# Patient Record
Sex: FEMALE | Race: WHITE | NOT HISPANIC OR LATINO
[De-identification: names, ages, dates, MRNs, and addresses within clinical notes are randomized per-mention and may not be internally consistent; named-entity substitution may affect disease eponyms.]

---

## 2018-06-22 ENCOUNTER — APPOINTMENT (OUTPATIENT)
Dept: OBGYN | Facility: CLINIC | Age: 48
End: 2018-06-22
Payer: COMMERCIAL

## 2018-06-22 VITALS — HEIGHT: 59 IN | WEIGHT: 157 LBS | BODY MASS INDEX: 31.65 KG/M2

## 2018-06-22 DIAGNOSIS — Z00.00 ENCOUNTER FOR GENERAL ADULT MEDICAL EXAMINATION W/OUT ABNORMAL FINDINGS: ICD-10-CM

## 2018-06-22 DIAGNOSIS — R10.2 PELVIC AND PERINEAL PAIN: ICD-10-CM

## 2018-06-22 PROCEDURE — 99214 OFFICE O/P EST MOD 30 MIN: CPT

## 2018-06-25 LAB
C TRACH RRNA SPEC QL NAA+PROBE: NOT DETECTED
HPV HIGH+LOW RISK DNA PNL CVX: NOT DETECTED
N GONORRHOEA RRNA SPEC QL NAA+PROBE: NOT DETECTED
SOURCE TP AMPLIFICATION: NORMAL

## 2018-06-27 LAB — CYTOLOGY CVX/VAG DOC THIN PREP: NORMAL

## 2018-07-11 ENCOUNTER — ASOB RESULT (OUTPATIENT)
Age: 48
End: 2018-07-11

## 2018-07-11 ENCOUNTER — APPOINTMENT (OUTPATIENT)
Dept: OBGYN | Facility: CLINIC | Age: 48
End: 2018-07-11
Payer: COMMERCIAL

## 2018-07-11 PROCEDURE — 76830 TRANSVAGINAL US NON-OB: CPT

## 2018-07-11 PROCEDURE — 76857 US EXAM PELVIC LIMITED: CPT

## 2018-07-24 ENCOUNTER — TRANSCRIPTION ENCOUNTER (OUTPATIENT)
Age: 48
End: 2018-07-24

## 2018-08-03 ENCOUNTER — APPOINTMENT (OUTPATIENT)
Dept: OBGYN | Facility: CLINIC | Age: 48
End: 2018-08-03
Payer: COMMERCIAL

## 2018-08-03 VITALS — WEIGHT: 159 LBS | BODY MASS INDEX: 32.05 KG/M2 | HEIGHT: 59 IN

## 2018-08-03 DIAGNOSIS — N93.8 OTHER SPECIFIED ABNORMAL UTERINE AND VAGINAL BLEEDING: ICD-10-CM

## 2018-08-03 PROCEDURE — 99213 OFFICE O/P EST LOW 20 MIN: CPT

## 2018-08-23 ENCOUNTER — TRANSCRIPTION ENCOUNTER (OUTPATIENT)
Age: 48
End: 2018-08-23

## 2018-08-28 ENCOUNTER — OUTPATIENT (OUTPATIENT)
Dept: OUTPATIENT SERVICES | Facility: HOSPITAL | Age: 48
LOS: 1 days | End: 2018-08-28
Payer: COMMERCIAL

## 2018-08-28 DIAGNOSIS — Z01.818 ENCOUNTER FOR OTHER PREPROCEDURAL EXAMINATION: ICD-10-CM

## 2018-08-28 LAB — HCG UR QL: NEGATIVE — SIGNIFICANT CHANGE UP

## 2018-08-28 PROCEDURE — G0463: CPT

## 2018-08-28 PROCEDURE — 81025 URINE PREGNANCY TEST: CPT

## 2018-09-06 ENCOUNTER — RESULT REVIEW (OUTPATIENT)
Age: 48
End: 2018-09-06

## 2018-09-06 ENCOUNTER — APPOINTMENT (OUTPATIENT)
Dept: OBGYN | Facility: AMBULATORY SURGERY CENTER | Age: 48
End: 2018-09-06
Payer: COMMERCIAL

## 2018-09-06 PROCEDURE — 58558 HYSTEROSCOPY BIOPSY: CPT

## 2018-09-14 ENCOUNTER — APPOINTMENT (OUTPATIENT)
Dept: OBGYN | Facility: CLINIC | Age: 48
End: 2018-09-14
Payer: COMMERCIAL

## 2018-09-14 VITALS
BODY MASS INDEX: 30.24 KG/M2 | WEIGHT: 150 LBS | DIASTOLIC BLOOD PRESSURE: 89 MMHG | HEIGHT: 59 IN | SYSTOLIC BLOOD PRESSURE: 142 MMHG | HEART RATE: 105 BPM

## 2018-09-14 DIAGNOSIS — N95.0 POSTMENOPAUSAL BLEEDING: ICD-10-CM

## 2018-09-14 PROCEDURE — 99213 OFFICE O/P EST LOW 20 MIN: CPT

## 2018-10-16 ENCOUNTER — TRANSCRIPTION ENCOUNTER (OUTPATIENT)
Age: 48
End: 2018-10-16

## 2018-10-17 ENCOUNTER — APPOINTMENT (OUTPATIENT)
Dept: ORTHOPEDIC SURGERY | Facility: CLINIC | Age: 48
End: 2018-10-17

## 2019-03-21 ENCOUNTER — TRANSCRIPTION ENCOUNTER (OUTPATIENT)
Age: 49
End: 2019-03-21

## 2019-03-21 ENCOUNTER — EMERGENCY (EMERGENCY)
Facility: HOSPITAL | Age: 49
LOS: 1 days | Discharge: HOME | End: 2019-03-21
Attending: EMERGENCY MEDICINE
Payer: COMMERCIAL

## 2019-03-21 VITALS
TEMPERATURE: 98 F | OXYGEN SATURATION: 100 % | RESPIRATION RATE: 18 BRPM | SYSTOLIC BLOOD PRESSURE: 152 MMHG | DIASTOLIC BLOOD PRESSURE: 88 MMHG | HEART RATE: 94 BPM

## 2019-03-21 LAB
ALBUMIN SERPL ELPH-MCNC: 4.8 G/DL — SIGNIFICANT CHANGE UP (ref 3.3–5.2)
ALP SERPL-CCNC: 99 U/L — SIGNIFICANT CHANGE UP (ref 40–120)
ALT FLD-CCNC: 12 U/L — SIGNIFICANT CHANGE UP
ANION GAP SERPL CALC-SCNC: 15 MMOL/L — SIGNIFICANT CHANGE UP (ref 5–17)
AST SERPL-CCNC: 15 U/L — SIGNIFICANT CHANGE UP
BILIRUB SERPL-MCNC: 0.7 MG/DL — SIGNIFICANT CHANGE UP (ref 0.4–2)
BUN SERPL-MCNC: 7 MG/DL — LOW (ref 8–20)
CALCIUM SERPL-MCNC: 9.6 MG/DL — SIGNIFICANT CHANGE UP (ref 8.6–10.2)
CHLORIDE SERPL-SCNC: 100 MMOL/L — SIGNIFICANT CHANGE UP (ref 98–107)
CK SERPL-CCNC: 73 U/L — SIGNIFICANT CHANGE UP (ref 25–170)
CO2 SERPL-SCNC: 26 MMOL/L — SIGNIFICANT CHANGE UP (ref 22–29)
CREAT SERPL-MCNC: 0.64 MG/DL — SIGNIFICANT CHANGE UP (ref 0.5–1.3)
D DIMER BLD IA.RAPID-MCNC: <150 NG/ML DDU — SIGNIFICANT CHANGE UP
GLUCOSE SERPL-MCNC: 100 MG/DL — SIGNIFICANT CHANGE UP (ref 70–115)
HCT VFR BLD CALC: 44.8 % — SIGNIFICANT CHANGE UP (ref 37–47)
HGB BLD-MCNC: 14.7 G/DL — SIGNIFICANT CHANGE UP (ref 12–16)
MCHC RBC-ENTMCNC: 29.6 PG — SIGNIFICANT CHANGE UP (ref 27–31)
MCHC RBC-ENTMCNC: 32.8 G/DL — SIGNIFICANT CHANGE UP (ref 32–36)
MCV RBC AUTO: 90.1 FL — SIGNIFICANT CHANGE UP (ref 81–99)
PLATELET # BLD AUTO: 324 K/UL — SIGNIFICANT CHANGE UP (ref 150–400)
POTASSIUM SERPL-MCNC: 3.8 MMOL/L — SIGNIFICANT CHANGE UP (ref 3.5–5.3)
POTASSIUM SERPL-SCNC: 3.8 MMOL/L — SIGNIFICANT CHANGE UP (ref 3.5–5.3)
PROT SERPL-MCNC: 8 G/DL — SIGNIFICANT CHANGE UP (ref 6.6–8.7)
RBC # BLD: 4.97 M/UL — SIGNIFICANT CHANGE UP (ref 4.4–5.2)
RBC # FLD: 13.1 % — SIGNIFICANT CHANGE UP (ref 11–15.6)
SODIUM SERPL-SCNC: 141 MMOL/L — SIGNIFICANT CHANGE UP (ref 135–145)
T4 AB SER-ACNC: 6.4 UG/DL — SIGNIFICANT CHANGE UP (ref 4.5–12)
TROPONIN T SERPL-MCNC: <0.01 NG/ML — SIGNIFICANT CHANGE UP (ref 0–0.06)
TSH SERPL-MCNC: 0.99 UIU/ML — SIGNIFICANT CHANGE UP (ref 0.27–4.2)
WBC # BLD: 11.1 K/UL — HIGH (ref 4.8–10.8)
WBC # FLD AUTO: 11.1 K/UL — HIGH (ref 4.8–10.8)

## 2019-03-21 PROCEDURE — 36415 COLL VENOUS BLD VENIPUNCTURE: CPT

## 2019-03-21 PROCEDURE — 99284 EMERGENCY DEPT VISIT MOD MDM: CPT

## 2019-03-21 PROCEDURE — 84484 ASSAY OF TROPONIN QUANT: CPT

## 2019-03-21 PROCEDURE — 93010 ELECTROCARDIOGRAM REPORT: CPT

## 2019-03-21 PROCEDURE — 84443 ASSAY THYROID STIM HORMONE: CPT

## 2019-03-21 PROCEDURE — 93005 ELECTROCARDIOGRAM TRACING: CPT

## 2019-03-21 PROCEDURE — 85027 COMPLETE CBC AUTOMATED: CPT

## 2019-03-21 PROCEDURE — 99283 EMERGENCY DEPT VISIT LOW MDM: CPT

## 2019-03-21 PROCEDURE — 80053 COMPREHEN METABOLIC PANEL: CPT

## 2019-03-21 PROCEDURE — 85379 FIBRIN DEGRADATION QUANT: CPT

## 2019-03-21 PROCEDURE — 82550 ASSAY OF CK (CPK): CPT

## 2019-03-21 PROCEDURE — 84436 ASSAY OF TOTAL THYROXINE: CPT

## 2019-03-21 NOTE — ED ADULT TRIAGE NOTE - CHIEF COMPLAINT QUOTE
Pt reports left sided chest/rib pain, its intermittent with no associated symptoms. Pt has hx of HTN. Denies SOB or fever.

## 2019-03-21 NOTE — ED ADULT NURSE NOTE - OBJECTIVE STATEMENT
49 yof presents to ed complaining of palpitations and weird feeling in upper abdomen. denies cp at this time. denies n/v/d denies numbness denies tingling denies changes in bowel patterns denies gi upset. at this time there are no other complaints. pt reports having anxiety.

## 2019-03-21 NOTE — ED STATDOCS - PROGRESS NOTE DETAILS
Pa note: No acute findings on lab work. Pt with complete resolution of symptoms in ED. Pain likely msk vs anxiety related. Pt educated to use ibuprofen 600mg every 8 hours and given referral for Dr. Pearl ( Cards ). Pt educated to return to ED if symptoms return or she develops severe HA, SOB, palpations.

## 2019-03-21 NOTE — ED STATDOCS - OBJECTIVE STATEMENT
48 y/o F pt with hx of anxiety, HTN presents to ED c/o intermittent episodes of CP throughout the day x2 weeks, yesterday pain was localized to LUQ of abd. Pt states onset of episodes is occasionally caused by movement or coughing, other times not. Unable to describe sensation. Mild cough over the last week, improved slightly.  Pt started menopause relatively young. CXR negative as per PCP who called during visit. Denies recent travel or long car rides, LE swelling, SOB, difficulty breathing, N/V, recent trauma.

## 2019-11-11 ENCOUNTER — EMERGENCY (EMERGENCY)
Facility: HOSPITAL | Age: 49
LOS: 1 days | Discharge: HOME | End: 2019-11-11
Attending: STUDENT IN AN ORGANIZED HEALTH CARE EDUCATION/TRAINING PROGRAM
Payer: COMMERCIAL

## 2019-11-11 VITALS
WEIGHT: 145.06 LBS | RESPIRATION RATE: 22 BRPM | DIASTOLIC BLOOD PRESSURE: 82 MMHG | HEIGHT: 59 IN | HEART RATE: 88 BPM | TEMPERATURE: 98 F | SYSTOLIC BLOOD PRESSURE: 132 MMHG | OXYGEN SATURATION: 100 %

## 2019-11-11 LAB
ALBUMIN SERPL ELPH-MCNC: 4.6 G/DL — SIGNIFICANT CHANGE UP (ref 3.3–5.2)
ALP SERPL-CCNC: 86 U/L — SIGNIFICANT CHANGE UP (ref 40–120)
ALT FLD-CCNC: 12 U/L — SIGNIFICANT CHANGE UP
ANION GAP SERPL CALC-SCNC: 15 MMOL/L — SIGNIFICANT CHANGE UP (ref 5–17)
APPEARANCE UR: CLEAR — SIGNIFICANT CHANGE UP
AST SERPL-CCNC: 17 U/L — SIGNIFICANT CHANGE UP
BASOPHILS # BLD AUTO: 0.05 K/UL — SIGNIFICANT CHANGE UP (ref 0–0.2)
BASOPHILS NFR BLD AUTO: 0.4 % — SIGNIFICANT CHANGE UP (ref 0–2)
BILIRUB SERPL-MCNC: 0.7 MG/DL — SIGNIFICANT CHANGE UP (ref 0.4–2)
BILIRUB UR-MCNC: NEGATIVE — SIGNIFICANT CHANGE UP
BUN SERPL-MCNC: 10 MG/DL — SIGNIFICANT CHANGE UP (ref 8–20)
CALCIUM SERPL-MCNC: 9.6 MG/DL — SIGNIFICANT CHANGE UP (ref 8.6–10.2)
CHLORIDE SERPL-SCNC: 101 MMOL/L — SIGNIFICANT CHANGE UP (ref 98–107)
CO2 SERPL-SCNC: 23 MMOL/L — SIGNIFICANT CHANGE UP (ref 22–29)
COLOR SPEC: YELLOW — SIGNIFICANT CHANGE UP
CREAT SERPL-MCNC: 0.67 MG/DL — SIGNIFICANT CHANGE UP (ref 0.5–1.3)
DIFF PNL FLD: ABNORMAL
EOSINOPHIL # BLD AUTO: 0.05 K/UL — SIGNIFICANT CHANGE UP (ref 0–0.5)
EOSINOPHIL NFR BLD AUTO: 0.4 % — SIGNIFICANT CHANGE UP (ref 0–6)
GLUCOSE SERPL-MCNC: 109 MG/DL — SIGNIFICANT CHANGE UP (ref 70–115)
GLUCOSE UR QL: NEGATIVE MG/DL — SIGNIFICANT CHANGE UP
HCG SERPL-ACNC: <4 MIU/ML — SIGNIFICANT CHANGE UP
HCT VFR BLD CALC: 43.6 % — SIGNIFICANT CHANGE UP (ref 34.5–45)
HGB BLD-MCNC: 14.1 G/DL — SIGNIFICANT CHANGE UP (ref 11.5–15.5)
IMM GRANULOCYTES NFR BLD AUTO: 0.2 % — SIGNIFICANT CHANGE UP (ref 0–1.5)
KETONES UR-MCNC: NEGATIVE — SIGNIFICANT CHANGE UP
LEUKOCYTE ESTERASE UR-ACNC: NEGATIVE — SIGNIFICANT CHANGE UP
LIDOCAIN IGE QN: 44 U/L — SIGNIFICANT CHANGE UP (ref 22–51)
LYMPHOCYTES # BLD AUTO: 1.57 K/UL — SIGNIFICANT CHANGE UP (ref 1–3.3)
LYMPHOCYTES # BLD AUTO: 11.7 % — LOW (ref 13–44)
MCHC RBC-ENTMCNC: 30.1 PG — SIGNIFICANT CHANGE UP (ref 27–34)
MCHC RBC-ENTMCNC: 32.3 GM/DL — SIGNIFICANT CHANGE UP (ref 32–36)
MCV RBC AUTO: 93.2 FL — SIGNIFICANT CHANGE UP (ref 80–100)
MONOCYTES # BLD AUTO: 0.56 K/UL — SIGNIFICANT CHANGE UP (ref 0–0.9)
MONOCYTES NFR BLD AUTO: 4.2 % — SIGNIFICANT CHANGE UP (ref 2–14)
NEUTROPHILS # BLD AUTO: 11.2 K/UL — HIGH (ref 1.8–7.4)
NEUTROPHILS NFR BLD AUTO: 83.1 % — HIGH (ref 43–77)
NITRITE UR-MCNC: NEGATIVE — SIGNIFICANT CHANGE UP
PH UR: 6.5 — SIGNIFICANT CHANGE UP (ref 5–8)
PLATELET # BLD AUTO: 293 K/UL — SIGNIFICANT CHANGE UP (ref 150–400)
POTASSIUM SERPL-MCNC: 4.1 MMOL/L — SIGNIFICANT CHANGE UP (ref 3.5–5.3)
POTASSIUM SERPL-SCNC: 4.1 MMOL/L — SIGNIFICANT CHANGE UP (ref 3.5–5.3)
PROT SERPL-MCNC: 8 G/DL — SIGNIFICANT CHANGE UP (ref 6.6–8.7)
PROT UR-MCNC: NEGATIVE MG/DL — SIGNIFICANT CHANGE UP
RBC # BLD: 4.68 M/UL — SIGNIFICANT CHANGE UP (ref 3.8–5.2)
RBC # FLD: 12.3 % — SIGNIFICANT CHANGE UP (ref 10.3–14.5)
SODIUM SERPL-SCNC: 139 MMOL/L — SIGNIFICANT CHANGE UP (ref 135–145)
SP GR SPEC: 1 — LOW (ref 1.01–1.02)
UROBILINOGEN FLD QL: NEGATIVE MG/DL — SIGNIFICANT CHANGE UP
WBC # BLD: 13.46 K/UL — HIGH (ref 3.8–10.5)
WBC # FLD AUTO: 13.46 K/UL — HIGH (ref 3.8–10.5)

## 2019-11-11 PROCEDURE — 99284 EMERGENCY DEPT VISIT MOD MDM: CPT | Mod: 25

## 2019-11-11 PROCEDURE — 85027 COMPLETE CBC AUTOMATED: CPT

## 2019-11-11 PROCEDURE — 74177 CT ABD & PELVIS W/CONTRAST: CPT

## 2019-11-11 PROCEDURE — 87086 URINE CULTURE/COLONY COUNT: CPT

## 2019-11-11 PROCEDURE — 83690 ASSAY OF LIPASE: CPT

## 2019-11-11 PROCEDURE — 84702 CHORIONIC GONADOTROPIN TEST: CPT

## 2019-11-11 PROCEDURE — 99284 EMERGENCY DEPT VISIT MOD MDM: CPT

## 2019-11-11 PROCEDURE — 81001 URINALYSIS AUTO W/SCOPE: CPT

## 2019-11-11 PROCEDURE — 76705 ECHO EXAM OF ABDOMEN: CPT | Mod: 26

## 2019-11-11 PROCEDURE — 36415 COLL VENOUS BLD VENIPUNCTURE: CPT

## 2019-11-11 PROCEDURE — 76705 ECHO EXAM OF ABDOMEN: CPT

## 2019-11-11 PROCEDURE — 80053 COMPREHEN METABOLIC PANEL: CPT

## 2019-11-11 PROCEDURE — 74177 CT ABD & PELVIS W/CONTRAST: CPT | Mod: 26

## 2019-11-11 NOTE — ED STATDOCS - NS ED ROS FT
No fever/chills, No photophobia/eye pain/changes in vision, No ear pain/sore throat/dysphagia, No chest pain/palpitations, no SOB/cough/wheeze/stridor. (+) Abdominal pain. (+) Mild stool retention. No N/V/D, no dysuria/frequency/discharge, No neck/back pain, no rash, no changes in neurological status/function.

## 2019-11-11 NOTE — ED ADULT TRIAGE NOTE - CHIEF COMPLAINT QUOTE
49 year old female in no acute distress, alert and oriented x 3, c/o intermittent right flank pain x one week. Pt reports having similar symptoms one year ago and being told she had sludge in her gallbladder. Pt denies n/v/d but states "My bm's are not normal, I have constant urges and then only a small soft amt comes out". Pt denies hematuria. Pt denies pain at this time but states "I got two very bad sharp pains at work today, it comes and goes".

## 2019-11-11 NOTE — ED ADULT NURSE NOTE - OBJECTIVE STATEMENT
pt care assumed at 1510. pt received Alert and Oriented to person, place, situation and time sitting in bed comfortably with mother at bedside. pt c/o RLQ pain for 1 week. pt states pain is intermittent and "feels like a pinch when it happens." pt educated on plan of care, pt able to successfully teach back plan of care to RN, RN will continue to reeducate pt during hospital stay.

## 2019-11-11 NOTE — ED ADULT NURSE NOTE - GENITOURINARY WDL
ANTICOAGULATION THERAPY EDUCATION   PATIENT  INSTRUCTION    Your Guide to Using Warfarin:  Please refer to this handout for questions regarding your medication, Coumadin/Warfarin. This handout includes information on dosing, blood testing, possible side effects of Coumadin/Warfarin, using other medications, dietary guidelines and safety concerns while on anticoagulation therapy.    Please contact your primary care physician and/or seek appropriate medical care if you experience:  · A serious fall  · Increased menstrual bleeding   · An injury to your head  · Notice a different color urine or stool   · Increased bleeding with teeth brushing   · If you have any other unusual bruising   · Have bleeding from your nose or a cut that doesn't stop bleeding         Call your physician immediately if you notice any signs and symptoms of a blood clot such as:  · Sudden weakness in any limb  · Dizziness or faintness   · Numbness or tingling anywhere  · New shortness of breath or chest pain   · Sudden onset of slurred speech or inability to speak  · New pain, swelling, redness or heat in any extremity   · Visual changes or loss of sight in either eye         Other factors that may affect your anticoagulation therapy include:  · Fever  · Stress   · Diarrhea  · Changes in exercise/activity level   · Vomiting         While on Anticoagulation therapy avoid:  · Pregnancy, using birth control and hormone replacement therapy    · Body piercing or tattoos      Please inform family members and other health care providers that you are on anticoagulation therapy. Make sure to carry an up-to-date medication list. You can also wear a medical alert bracelet to identify that you are on anticoagulation therapy.    If you are utilizing an injectable anticoagulation medication you should be aware of how and where the medication should be injected and how to appropriately dispose of the injection needles (sharps).    Additional patient  education materials provided to you:  · Important Facts about your Coumadin (color handout)  · Vitamin K Food List  · Travel Fitness Tips for Patients on Coumadin  · Prevention and Treatment of Nosebleeds  · When To Call Your Physician  · Potential and Documented Interaction of Herbs with Coumadin/Warfarin  · Lab hours for [facility]   Bladder non-tender and non-distended.

## 2019-11-11 NOTE — ED STATDOCS - OBJECTIVE STATEMENT
50 y/o F pt with no significant PMHx presents to the ED c/o intermittent RUQ pain, onset 1 week ago. Associated symptom of mild stool retention. She reports that the pain onset with no precipitating factors. Patient describes the abdominal pain as pinching, and stabbing. Patient recalls that she had similar pain about 1 year ago, and had an US performed, which reportedly showed "sludge" in her gallbladder. She went to her PMD 3 days ago and had an US performed, but has not received the results. Patient is currently not having abdominal pain in the ED. Denies nausea, vomiting, difficulty breathing, and coughing. No further acute complaints at this time.

## 2019-11-11 NOTE — ED STATDOCS - PATIENT PORTAL LINK FT
You can access the FollowMyHealth Patient Portal offered by [facility] by registering at the following website: [email or web address]. By joining [employer]’s FollowMyHealth portal, you will also be able to view your health information using other applications (apps) compatible with our system.

## 2019-11-11 NOTE — ED STATDOCS - PROGRESS NOTE DETAILS
pt is seen by Dr pulliam initially agreed with hx , PE and plan    re examine the abdominal BS x 4 not TTP , us with liver hemangioma , recommended the CT  thatis ordered pt is endorsed To Y[facility] wu pending CT result , please tx the pt accordingly possible GI referral Guanakito Squires PA-C:   PT seen resting comfortably in no acute distress, no acute complaints at this time, PT tolerating PO intake,  PT informed of all results, PT with no acute findings cleared for out pt home,  PT will be DC home with follow up to GI, PT educated about when to return to the ED if needed. PT verbalizes that he understands all instructions and results.

## 2019-11-11 NOTE — ED ADULT TRIAGE NOTE - PAIN: PRESENCE, MLM
complains of pain/discomfort
Aortic dissection, abdominal  Type B Watched regularly  Aortic dissection, thoracic  Type A Repaired  Blindness of left eye    Chronic constipation    Hematoma  spinal  HTN (Hypertension)    Osteoporosis    PAD (peripheral artery disease)    Paraplegia    TIA (transient ischemic attack)    UTI (urinary tract infection)    Uveitis

## 2019-11-11 NOTE — ED STATDOCS - ATTENDING CONTRIBUTION TO CARE
I performed a face to face history and physical exam of the patient and discussed their management with the resident/ACP. I reviewed the resident/ACP's note and agree with the documented findings and plan of care.    labs and imaging pending. Pt to be followed by YG Lujan pending results. if neg d/c with outpatient f/up.

## 2019-11-12 LAB
CULTURE RESULTS: NO GROWTH — SIGNIFICANT CHANGE UP
SPECIMEN SOURCE: SIGNIFICANT CHANGE UP

## 2019-11-15 ENCOUNTER — APPOINTMENT (OUTPATIENT)
Dept: GASTROENTEROLOGY | Facility: CLINIC | Age: 49
End: 2019-11-15

## 2019-12-25 PROBLEM — R10.2 PELVIC PAIN IN FEMALE: Status: ACTIVE | Noted: 2018-06-22

## 2020-02-19 PROBLEM — Z78.9 OTHER SPECIFIED HEALTH STATUS: Chronic | Status: ACTIVE | Noted: 2019-11-13

## 2020-02-20 ENCOUNTER — APPOINTMENT (OUTPATIENT)
Dept: OBGYN | Facility: CLINIC | Age: 50
End: 2020-02-20

## 2020-09-11 ENCOUNTER — APPOINTMENT (OUTPATIENT)
Dept: GYNECOLOGIC ONCOLOGY | Facility: CLINIC | Age: 50
End: 2020-09-11
Payer: COMMERCIAL

## 2020-09-11 DIAGNOSIS — D27.9 BENIGN NEOPLASM OF UNSPECIFIED OVARY: ICD-10-CM

## 2020-09-11 DIAGNOSIS — I10 ESSENTIAL (PRIMARY) HYPERTENSION: ICD-10-CM

## 2020-09-11 DIAGNOSIS — Z80.3 FAMILY HISTORY OF MALIGNANT NEOPLASM OF BREAST: ICD-10-CM

## 2020-09-11 DIAGNOSIS — Z87.891 PERSONAL HISTORY OF NICOTINE DEPENDENCE: ICD-10-CM

## 2020-09-11 DIAGNOSIS — Z86.79 PERSONAL HISTORY OF OTHER DISEASES OF THE CIRCULATORY SYSTEM: ICD-10-CM

## 2020-09-11 DIAGNOSIS — Z72.89 OTHER PROBLEMS RELATED TO LIFESTYLE: ICD-10-CM

## 2020-09-11 DIAGNOSIS — Z80.9 FAMILY HISTORY OF MALIGNANT NEOPLASM, UNSPECIFIED: ICD-10-CM

## 2020-09-11 PROCEDURE — 93976 VASCULAR STUDY: CPT | Mod: 59

## 2020-09-11 PROCEDURE — 76830 TRANSVAGINAL US NON-OB: CPT | Mod: 59

## 2020-09-11 PROCEDURE — 76857 US EXAM PELVIC LIMITED: CPT | Mod: 59

## 2020-09-11 PROCEDURE — 99203 OFFICE O/P NEW LOW 30 MIN: CPT | Mod: 25

## 2020-09-15 NOTE — PAST MEDICAL HISTORY
[Postmenopausal] : The patient is postmenopausal [Menopause Age____] : age at menopause was [unfilled] [Approximately ___] : the LMP was approximately [unfilled] [Total Preg ___] : G[unfilled] [Abortions ___] : Abortions:[unfilled] [AB Induced ___] : elective abortions: [unfilled]  [AB Spont ___] : miscarriages: [unfilled]  [de-identified] : 45

## 2020-09-15 NOTE — HISTORY OF PRESENT ILLNESS
[FreeTextEntry1] : This 51yo  LMP 5 years ago referred by Dr. Morrison for evaluation of a growing dermoid cyst. Patient reports having the cyst for years, is not symptomatic. Pelvic US on 20 revealed an echogenic structure in left ovary measuring 2.4 x 2.3 x 2.6cm consistent with dermoid. MRI pelvis on 20 confirmed a left dermoid cyst measuring 3.8cm unchanged from prior ctscan in Aug 2018. \par \par Patient denies pelvic or abdominal pain. Has occasional discharge. Denies any vaginal bleeding after normal EMB in 2018 (EMB done for AUB). \par \par Pap smear-3/2020 wnl\par Mammo-3/2020-6 month right breast US recommended \par Colonoscopy-4 1/2 years ago (due to hemorrhoids, due now)

## 2020-09-15 NOTE — END OF VISIT
[FreeTextEntry3] : Written by Guerda RONQUILLO, acting as a scribe for Dr. Gunnar Driver.\par This note accurately reflects the work and decisions made by me.\par

## 2020-09-15 NOTE — CHIEF COMPLAINT
[FreeTextEntry1] : [facility]\par \par [facility] [phone] at [address] [place] [id number]\par

## 2020-09-15 NOTE — PHYSICAL EXAM
[Normal] : Bimanual Exam: Normal [de-identified] : Patient was interviewed and examined with chaperone present. Name of chaperone: Guerda Salcido

## 2020-09-15 NOTE — ASSESSMENT
[FreeTextEntry1] : This 51yo  LMP 5 years ago referred by Dr. Morrison for evaluation of a growing dermoid cyst, currently 4.4cm left ovarian dermoid increased from 3.8cm in 2018.

## 2020-12-24 NOTE — ED STATDOCS - CROS ED ROS STATEMENT
all other ROS negative except as per HPI
Continue Regimen: Avar cleanser \\nEpiduo Forte
Detail Level: Simple

## 2021-01-26 ENCOUNTER — EMERGENCY (EMERGENCY)
Facility: HOSPITAL | Age: 51
LOS: 1 days | Discharge: HOME | End: 2021-01-26
Payer: COMMERCIAL

## 2021-01-26 VITALS
HEART RATE: 86 BPM | TEMPERATURE: 99 F | OXYGEN SATURATION: 98 % | DIASTOLIC BLOOD PRESSURE: 73 MMHG | RESPIRATION RATE: 20 BRPM | HEIGHT: 59 IN | SYSTOLIC BLOOD PRESSURE: 150 MMHG

## 2021-01-26 LAB — SARS-COV-2 RNA SPEC QL NAA+PROBE: SIGNIFICANT CHANGE UP

## 2021-01-26 PROCEDURE — U0005: CPT

## 2021-01-26 PROCEDURE — 99283 EMERGENCY DEPT VISIT LOW MDM: CPT

## 2021-01-26 PROCEDURE — U0003: CPT

## 2021-01-26 NOTE — ED PROVIDER NOTE - NS ED ROS FT
Gen: denies fever, chills, fatigue, weight loss  Skin: denies rashes, laceration, bruising  HEENT: denies visual changes, ear pain, nasal congestion, throat pain  Respiratory: denies GUZMAN, SOB, cough, wheezing  Cardiovascular: denies chest pain, palpitations, diaphoresis, LE edema  GI: denies abdominal pain, n/v/d

## 2021-01-26 NOTE — ED PROVIDER NOTE - PATIENT PORTAL LINK FT
You can access the FollowMyHealth Patient Portal offered by [facility] by registering at the following website: [email or web address]. By joining [employer]’s FollowMyHealth portal, you will also be able to view your health information using other applications (apps) compatible with our system.

## 2021-01-26 NOTE — ED PROVIDER NOTE - OBJECTIVE STATEMENT
52yo F presenting for covid testing. Pt had positive exposure to her mother who is positive. Pt feeling well, no symptoms. Denies fever, chills, cough, sob, cp, body aches, loss of smell/taste.

## 2021-01-30 ENCOUNTER — EMERGENCY (EMERGENCY)
Facility: HOSPITAL | Age: 51
LOS: 1 days | Discharge: HOME | End: 2021-01-30
Payer: COMMERCIAL

## 2021-01-30 VITALS
OXYGEN SATURATION: 98 % | DIASTOLIC BLOOD PRESSURE: 86 MMHG | HEART RATE: 90 BPM | RESPIRATION RATE: 18 BRPM | HEIGHT: 59 IN | SYSTOLIC BLOOD PRESSURE: 135 MMHG | TEMPERATURE: 98 F

## 2021-01-30 LAB — SARS-COV-2 RNA SPEC QL NAA+PROBE: SIGNIFICANT CHANGE UP

## 2021-01-30 PROCEDURE — 99283 EMERGENCY DEPT VISIT LOW MDM: CPT

## 2021-01-30 PROCEDURE — U0003: CPT

## 2021-01-30 PROCEDURE — 99282 EMERGENCY DEPT VISIT SF MDM: CPT

## 2021-01-30 PROCEDURE — U0005: CPT

## 2021-01-30 NOTE — ED PROVIDER NOTE - OBJECTIVE STATEMENT
52 y/o F presents to ED for COVID testing. Pt currently asymptomatic. No chest pain, sob, cough, abd pain, n/v/d, headache, dizziness. + sick contacts, pt's mother tested +. No recent travel. Pt well appearing. NAD.

## 2021-01-30 NOTE — ED PROVIDER NOTE - PATIENT PORTAL LINK FT
You can access the FollowMyHealth Patient Portal offered by [facility] by registering at the following website: [email or web address]. By joining [employer]’s FollowMyHealth portal, you will also be able to view your health information using other applications (apps) compatible with our system.

## 2021-03-10 ENCOUNTER — EMERGENCY (EMERGENCY)
Facility: HOSPITAL | Age: 51
LOS: 1 days | Discharge: HOME | End: 2021-03-10
Payer: COMMERCIAL

## 2021-03-10 VITALS
HEIGHT: 59 IN | TEMPERATURE: 98 F | SYSTOLIC BLOOD PRESSURE: 144 MMHG | RESPIRATION RATE: 18 BRPM | OXYGEN SATURATION: 100 % | DIASTOLIC BLOOD PRESSURE: 87 MMHG | HEART RATE: 83 BPM

## 2021-03-10 LAB — SARS-COV-2 RNA SPEC QL NAA+PROBE: SIGNIFICANT CHANGE UP

## 2021-03-10 PROCEDURE — 99283 EMERGENCY DEPT VISIT LOW MDM: CPT

## 2021-03-10 PROCEDURE — U0005: CPT

## 2021-03-10 PROCEDURE — U0003: CPT

## 2021-03-10 PROCEDURE — 99284 EMERGENCY DEPT VISIT MOD MDM: CPT

## 2021-03-10 NOTE — ED PROVIDER NOTE - OBJECTIVE STATEMENT
52 y/o F requesting swab. pt c/o sore throat, mild cough and tiredness. States multiple ppl at her husbands job tested + in the last week.

## 2021-03-10 NOTE — ED PROVIDER NOTE - NS ED ROS FT
Const: no fever , no chills, tiredness  Eyes: No redness, no discharge  ENT: + throat pain, no congestion  Cardiac: No chest pain  Resp: mild cough, no sob  GI: no abd pain, no n/v/d  : no dysuria   Skin: No rash  Neuro: no HA

## 2021-03-10 NOTE — ED PROVIDER NOTE - PATIENT PORTAL LINK FT
You can access the FollowMyHealth Patient Portal offered by [facility] by registering at the following website: [email or web address]. By joining [employer]’s FollowMyHealth portal, you will also be able to view your health information using other applications (apps) compatible with our system.

## 2021-03-10 NOTE — ED PROVIDER NOTE - PHYSICAL EXAMINATION
Constitutional: NAD, non toxic, well appearing  HEENT: NCAT, throat clear, no exudate uvula midline  Neck: Supple  Eyes: clear b/l  Neuro: Alert and oriented  Skin: No evidence of rash  Psych: normal mood

## 2021-03-10 NOTE — ED PROVIDER NOTE - CLINICAL SUMMARY MEDICAL DECISION MAKING FREE TEXT BOX
50 y/o F requesting swab with 2 days of sore throat, mild cough and tiredness. Pt nontoxic appearing, stable vitals, ambulatory with stable saturation without supplemental oxygen. PT advised about self-quarantine instructions until negative test results and/or symptom resolution. pt advised on hand hygiene, monitoring of symptoms, antipyretic use as well as and fu with primary care provider. Instructions given in pre-printed copy.

## 2021-04-14 ENCOUNTER — APPOINTMENT (OUTPATIENT)
Dept: DISASTER EMERGENCY | Facility: OTHER | Age: 51
End: 2021-04-14

## 2022-03-14 NOTE — ED ADULT TRIAGE NOTE - MODE OF ARRIVAL
What Is The Reason For Today's Visit?: Full Body Skin Examination What Is The Reason For Today's Visit? (Being Monitored For X): concerning skin lesions on an annual basis Additional History: BCC, nasal bridge, 2020, mohs completed Walk in

## 2022-04-14 ENCOUNTER — APPOINTMENT (OUTPATIENT)
Dept: DERMATOLOGY | Facility: CLINIC | Age: 52
End: 2022-04-14
Payer: COMMERCIAL

## 2022-04-14 PROCEDURE — 99203 OFFICE O/P NEW LOW 30 MIN: CPT

## 2022-05-26 ENCOUNTER — APPOINTMENT (OUTPATIENT)
Dept: DERMATOLOGY | Facility: CLINIC | Age: 52
End: 2022-05-26

## 2022-05-26 ENCOUNTER — NON-APPOINTMENT (OUTPATIENT)
Age: 52
End: 2022-05-26

## 2022-10-19 NOTE — ED STATDOCS - CPE ED RESP NORM
INTERNAL MEDICINE RESIDENCY DISCHARGE SUMMARY     Ayan Hagan   68 y o  female  MRN: [id number]  Room/Bed: [place] [address]   Encounter: [id number]    Principal Problem:    Acute encephalopathy  Active Problems:    Other schizophrenia ([facility] )    Parkinson's disease ([facility] )    Primary hypertension    Acquired hypothyroidism    Ileus ([facility] )    Seizure disorder ([facility] )    Suspected UTI    Respiratory failure, acute ([place] [address] )    CONCHITA (acute kidney injury) ([place] [address] )    Hyperammonemia ([place] [address] )      * Acute encephalopathy  Assessment & Plan  -Patient with history of Parkinson's, seizure disorder on Depakote, hypothyroidism and schizophrenia presenting with acute encephalopathy  Per patient's assisted care facility, patient has had a change in her mental status  Also had a fall on Wednesday without any head strike or loss of consciousness secondary to reported leg pain  She was being treated for suspected UTI with Keflex  Her POA, patient has had decreased mental status since prior admission for COVID-19 and ileus in a 2/2022  In the ED, patient was found to be afebrile and hemodynamically stable  She had increased oxygen requirement, requiring 2 L of O2  VBG revealed respiratory alkalosis  She was found to have an CONCHITA with creatinine 1 24 (baseline 0 6 - 0 7)  Ammonia elevated to 60  AST 61 and ALT 58  UA showed innumerable wbc's without any evidence of bacteria or nitrites  Chest x-ray and CT chest showed some mild pulmonary edema without any evidence of discrete infiltrates suggestive of pneumonia  Patient's presentation could be secondary to UTI versus hepatic encephalopathy versus worsening of hypothyroidism versus neurocognitive decline  - TSH, creatinine kinase, magnesium, phosphate, B12, folate WNL  -Depakote switched to Lamictal 20 mg => Lamictal discontinued   - yesterday, abdomen  Distended    CT abdomen pelvis revealed stool burden without any evidence of acute diverticulitis  - will try milk of magnesia Q 1 hours p r n  Until bowel movement => likely can go home if has bowel  - on discharge will provide Senokot scheduled, lactulose 20 g b i d  And MiraLax b i d   -continue with lactulose 20 g b i d   - urine culture less than 10,000 CFU E coli  -finished course of ceftriaxone  - continue with home carbidopa levodopa and Zyprexa  - consider geriatrics consult given advanced age, comorbidities, and recent change in mental status  - made level 1 code status based on conversation with facility  However, given that POA was expressed desire for level 3 should reassess in a m  Hyperammonemia ([facility] )  Assessment & Plan  -patient presenting with acute encephalopathy  This found to have ammonia 60, AST 61, ALT 58  Was on Depakote for seizures  Cannot rule out possibility of hepatic encephalopathy  -Appears to be back to baseline mental status, per conversation with POA and Facility  - continue lactulose 20 g b i d  And titrate to 2-3 bowel movements  - follow-up repeat ammonia level    CONCHITA (acute kidney injury) ([facility] )  Assessment & Plan  -on admission, creatinine 1 24 with baseline 0 6-0 8  Of note, her GFR has decreased to 42 from 85 in 02/2022  Her CONCHITA is likely early secondary to prerenal causes  Suspect she had poor p o  Intake secondary to her UTI  Cannot rule out possibility of urinary retention as cause for CONCHITA  Appears volume down on exam   - Creatinine 1 24 on presentation => Given gentle IVF => Improved to 0 96 today  - Lisinopril initially held => Given CONCHITA has resolved => Consider adding back lisinopril  - avoid hypotension and nephrotoxic agents      Respiratory failure, acute ([facility] )  Assessment & Plan  -On presentation, patient was placed on 2 L O2 and was satting at around 95-96%  Per chart review, patient was on supplemental oxygen during last admission in February 2022 and was weaned off by discharge    Chest x-ray and chest CT concerning for pulmonary edema  No noted previous echocardiograms on file  -   - Echo showed some grade 1 diastolic dysfunction without any evidence of systolic dysfunction  - chest x-ray showed improvement in pulmonary edema bilaterally  -weaned off oxygen to room air       Suspected UTI  Assessment & Plan  -Patient was having dysuria, could be renal and altered mental status at facility  PTA she had a UA concerning for and pyuria  Was also reported to have low-grade temp of 100 1°  Was started on Keflex of which she received 3 days  Her urine culture from facility grew skin say  UA in the ED showed innumerable wbc's without any evidence of nitrites or bacteria  Was given 1 dose Rocephin in ED  UTI could potentially be contributing to patient's current presentation of acute encephalopathy  - urine culture grew less than 10,000 CFU E coli  - finish 3 day course of ceftriaxone    Seizure disorder [facility])  Assessment & Plan  -patient is on Depakote 250 mg a m  And Depakote 500 mg at bedtime  However, patient is presenting with acute encephalopathy and was find to have hyperammonemia with ammonia 60  Have some concern for hepatic encephalopathy  - Depakote level: 59  - Depakote held on admission over concern for hepatic encephalopathy  - neurology was consulted and believed that patient did not have any seizure history per chart review and conversation was POA  Depakote was initiated for mood stabilization  -neurology recommended starting Lamictal 25 mg daily => however over concerns of acute encephalopathy and potential adverse effect of SJS did not initiate Lamictal  - pharmacy recommended optimizing Zyprexa rather than adding back Lamictal or Depakote for mood stabilization    Ileus [facility])  Assessment & Plan  -last admission in February 2022 was found to have ileus and sigmoidoscopy revealed angulated sigmoid colon => recommended to take MiraLax 17 g b i d   On discharge  - was treated for hepatic encephalopathy on this admission with lactulose 20 g b i d  => on 10/18 patient was found to have distended abdomen and moderate abdominal tenderness on exam => CT abdomen pelvis without contrast revealed moderate stool burden decreased from previously in February  - will continue with lactulose 20 g b i d  => titrate to 2 to 3 bowel movements per day  - can give milk of magnesia q 1 hour p r n  Until bowel movement  - likely discharge once has bowel movement  - will discharge on Senokot scheduled, lactulose 20 g b i d , MiraLax 17 g b i d  Acquired hypothyroidism  Assessment & Plan  -patient presenting with acute encephalopathy  Has a history of acquired hypothyroidism and is on levothyroxine 100 mcg at home  Most recent TSH on 0 1/31 was 5 14  - TSH at admission was normal at 3 690  - continue home levothyroxine 100 mcg    Primary hypertension  Assessment & Plan  -at home on propranolol 10 mg b i d , lisinopril 10 mg daily, amlodipine 10 mg daily  - lisinopril initially held in setting of CONCHITA => CONCHITA has resolved consider adding back  - continue home propranolol and amlodipine    Parkinson's disease ([facility] )  Assessment & Plan  -continue home carbidopa levodopa  mg b i d  Other schizophrenia ([facility] )  Assessment & Plan  -reported to be on Zyprexa 7 5 mg at home  -Zyprexa decreased to 5 mg        [address]      Per H&P on 10/15/2022: " Akila Caal is a 75-year-old female with past medical history significant for Parkinson's (on carbidopa levodopa), seizure disorder (on Depakote), hypertension, hypothyroidism, and schizophrenia who presents with acute encephalopathy  Patient is a very poor historian  She tends to go off on tangents after being asked questions  She did report that she was brought in to ED because her assisted care staff were concerned about her  She did report that she had some dysuria earlier this week    He denied any fevers, chills, chest pain, shortness of breath, lightheadedness, dizziness, abdominal pain, nausea, vomiting, diarrhea, or constipation  She comes from [facility] facility  Per staff at [facility], patient has been having change in mental status over this past week  They were unable to explain what exactly this change was for them  However, they did state that normally she is oriented to person place and time but is loquacious  She had a fall on 10/12 secondary to pain in her leg  There was no reported head strike or loss of consciousness  She also had lab work which was concerning for “dehydration” and a UA which showed 2+ bacteria  Over concerns for UTI she was started on Keflex on 10/13  She was also noted to have low-grade fever to 100 1 earlier this week  Also spoke to listed contact Wili Costa, who reported that she was the patient's POA  She states that patient has had changes in her mental status since her previous admission in February 2022 for COVID during which she was found to have ileus  The patient at baseline is reported to have some level of intellectual disability, however over these last few months it seems that she has not been herself  Otherwise, the POA states that she has not had any recent complaints of any symptoms  POA also reported that patient was DNR and DNI  However, facility reported that she was most recently level 1 code  Patient was most recently hospitalized from 01/31 to 02/08/2022 at [facility]  Was initially on 2 L O2 nasal cannula but was weaned off by discharge  She was also found to have a CT abdomen pelvis significant for severe distention of colon with gas and stool with transition at proximal sigmoid  Flex sig revealed no evidence of mass or obvious stricture  GI recommended MiraLax b i d  Even setting of loose stool  In the ED, patient was afebrile and hemodynamically stable  She was satting at around 93% on 2 L O2    Her lab work was significant for creatinine 1 24, AST 61, ALT 58, and ammonia 60  UA was significant for innumerable WBC without any evidence of bacteria  CT chest and chest x-ray were suggestive of mild pulmonary edema  She received 1 L normal saline and ceftriaxone x1 "    Hospital Course: On presentation, the patient found to have an CONCHITA with Cr of 1 24  Her baseline Cr is 0 6 -0 7  She was started on gentle IVF with resolution of CONCHITA  On presentation to the ED the patient was placed on 2L of O2 and satting at around 95-96%  She was found to have mild pulm edema on CXR and CT chest  Repeat CXR showed decreased pulm edema  Over course of admission, patient was weaned down to room air  UA showed innumerable WBC  Urine cx grew <10,000 cfu E coli  Ceftriaxone started for treatment of UTI and completed 3-day course  On admission, patient's ammonia level was 60 on presentation  Depakote held due to the hyperammonemia  Neurology was consulted and it did not appear as if patient had any past hx of seizures  Recommended starting Lamictal  However, it was not started over concerns for potential adverse effects  Home Zyprexa decreased to 5 mg qhs and patient continued to do well on decreased dose and appeared to be back at baseline mental status  Started on Lactulose 20 mg BID for potential hepatic encephalopathy  Found to have distended abdomen  CTAP revealed large stool burden, decreased from previous in February  Given Relistor 6 mg and was able to have 2-3 bowel movements daily thereafter w/ improvements in symptoms  Ammonia decreased to 44  PTA aspirin was discontinued given patient had no hx of CAD  She was discharged on senokot daily, lactulose 20 g BID, MiraLax 17 g BID  PT/OT recommended return to facility with rehab services  Patient discharged back to [facility]       Visit Vitals  BP (!) 87/59   Pulse 80   Temp 98 3 °F (36 8 °C)   Resp 18   Ht 5' 6" (1 676 m)   Wt 99 8 kg (220 lb)   SpO2 90%   BMI 35 51 kg/m² Smoking Status Never Smoker   BSA 2 08 m²   Physical Exam  Constitutional:       General: She is not in acute distress  Appearance: Normal appearance  She is obese  HENT:      Head: Normocephalic and atraumatic  Mouth/Throat:      Mouth: Mucous membranes are dry  Eyes:      Extraocular Movements: Extraocular movements intact  Cardiovascular:      Rate and Rhythm: Normal rate and regular rhythm  Pulses: Normal pulses  Heart sounds: Normal heart sounds  No murmur heard  No friction rub  Pulmonary:      Effort: Pulmonary effort is normal       Breath sounds: Normal breath sounds  No wheezing or rales  Abdominal:      General: Bowel sounds are normal  There is no distension  Palpations: Abdomen is soft  Tenderness: There is no abdominal tenderness  Musculoskeletal:      Right lower leg: Edema present  Left lower leg: Edema present  Comments: +1 bilateral pitting edema and tenderness bilaterally    Skin:     General: Skin is warm and dry  Neurological:      General: No focal deficit present  Mental Status: She is alert and oriented to person, place, and time  Comments: Tangential in conversation     Psychiatric:         Mood and Affect: Mood normal          Behavior: Behavior normal        DISCHARGE INFORMATION     PCP at Discharge: Juana Arana MD    Admitting Provider: Lauren Hinkle DO  Admission Date: 10/15/2022    Discharge Provider: Oswald Huang MD  Discharge Date: 10/19/2022  Discharge Disposition: Home with [address] [place] [address]  Discharge Condition: fair  Discharge with Lines: no    Discharge Diet: regular diet  Activity Restrictions: none  Test Results Pending at Discharge:  None  Discharge Diagnoses:  Principal Problem:    Acute encephalopathy  Active Problems:    Other schizophrenia ([facility] )    Parkinson's disease ([facility] )    Primary hypertension    Acquired hypothyroidism    Ileus ([facility] )    Seizure disorder ([facility] )    Suspected UTI    Respiratory failure, acute ([facility] )    CONCHITA (acute kidney injury) ([facility] )    Hyperammonemia ([facility] )  Resolved Problems:    * No resolved hospital problems  *      Consulting Providers:  None      Diagnostic & Therapeutic Procedures Performed:  CT abdomen pelvis wo contrast    Result Date: 10/18/2022  Impression: Constipation, less severe compared with January 31, 2022  Otherwise no acute pathology  Workstation performed: [id number]     XR chest portable    Result Date: 10/17/2022  Impression: 1  Low lung volumes with bibasilar opacification, increased on the right  2   Decreased pulmonary venous congestion  Workstation performed: [id number]     XR chest 1 view portable    Result Date: 10/16/2022  Impression: Low lung volumes with vascular crowding with mild bibasilar atelectasis and pulmonary venous congestion  Workstation performed: [id number]     CT head without contrast    Result Date: 10/15/2022  Impression: No acute intracranial abnormality  Workstation performed: [id number]     CT chest without contrast    Result Date: 10/15/2022  Impression: Markedly compromised by respiratory motion with probable mild interstitial edema  Trace effusions with mild bibasilar atelectasis  Workstation performed: [id number]     CT cervical spine without contrast    Result Date: 10/15/2022  Impression: No cervical spine fracture or traumatic malalignment    Severe degenerative disc disease from C3 to C7  Workstation performed: [id number]       Code Status: Level 1 - Full Code  Advance Directive & Living Will: <no information>  Power of :    POLST:      Medications:  Current Discharge Medication List        Current Discharge Medication List        Current Discharge Medication List      CONTINUE these medications which have NOT CHANGED    Details   amLODIPine (NORVASC) 10 mg tablet Take 10 mg by mouth daily      aspirin (ECOTRIN LOW STRENGTH) 81 mg EC tablet Take 81 mg by mouth daily      atorvastatin (LIPITOR) 40 mg tablet Take 40 mg by mouth daily nightly      carbidopa-levodopa (PARCOPA)  mg per disintegrating tablet Take 1 tablet by mouth 2 (two) times a day      divalproex sodium (Depakote ER) 500 mg 24 hr tablet Take 500 mg by mouth daily at bedtime Daily at bedtime 8 pm      Divalproex Sodium (DEPAKOTE PO) Take 250 mg by mouth in the morning Daily at 8 am      gabapentin (NEURONTIN) 100 mg capsule Take 100 mg by mouth 3 (three) times a day      hydrOXYzine HCL (ATARAX) 25 mg tablet Take 25 mg by mouth 2 (two) times a day      levothyroxine 100 mcg tablet Take 100 mcg by mouth daily      lisinopril (ZESTRIL) 10 mg tablet Take 10 mg by mouth daily      meclizine (ANTIVERT) 12 5 MG tablet Take 12 5 mg by mouth in the morning Daily at 8 am      OLANZapine (ZyPREXA) 7 5 mg tablet Take 7 5 mg by mouth daily at bedtime      omeprazole (PriLOSEC) 20 mg delayed release capsule Take 20 mg by mouth daily      oxybutynin (DITROPAN-XL) 5 mg 24 hr tablet Take 5 mg by mouth daily      polyethylene glycol (MIRALAX) 17 g packet Take 17 g by mouth 2 (two) times a day  Refills: 0    Associated Diagnoses: Abnormal CT scan, colon      potassium chloride (Klor-Con) 10 mEq tablet Take 10 mEq by mouth 2 (two) times a day      propranolol (INDERAL) 10 mg tablet Take 10 mg by mouth 2 (two) times a day      Skin Protectants, Misc  (MINERIN CREME EX) Apply topically as needed (dry skin) Twice daily prn      ZOLPIDEM TARTRATE PO Take 10 mg by mouth daily at bedtime             Allergies: Allergies   Allergen Reactions   • Pineapple - Food Allergy Other (See Comments)     Unknown reaction       FOLLOW-UP     PCP Outpatient Follow-up:  Follow-up with PCP within 1 week    Consulting Providers Follow-up:  None     Active Issues Requiring Follow-up:   Ileus    Discharge Statement:   I spent 45 minutes minutes discharging the patient  This time was spent on the day of discharge  I had direct contact with the patient on the day of discharge   Additional documentation is required if more than 30 minutes were spent on discharge  Portions of the record may have been created with voice recognition software  Occasional wrong word or "sound a like" substitutions may have occurred due to the inherent limitations of voice recognition software    Read the chart carefully and recognize, using context, where substitutions have occurred     ==  [facility]  Internal Medicine Resident PGY-1 normal...

## 2022-11-10 ENCOUNTER — APPOINTMENT (OUTPATIENT)
Dept: DERMATOLOGY | Facility: CLINIC | Age: 52
End: 2022-11-10

## 2022-11-10 PROCEDURE — 99213 OFFICE O/P EST LOW 20 MIN: CPT

## 2022-11-25 ENCOUNTER — NON-APPOINTMENT (OUTPATIENT)
Age: 52
End: 2022-11-25

## 2023-03-06 ENCOUNTER — NON-APPOINTMENT (OUTPATIENT)
Age: 53
End: 2023-03-06

## 2023-04-17 ENCOUNTER — APPOINTMENT (OUTPATIENT)
Dept: DERMATOLOGY | Facility: CLINIC | Age: 53
End: 2023-04-17
Payer: COMMERCIAL

## 2023-04-17 PROCEDURE — 99213 OFFICE O/P EST LOW 20 MIN: CPT

## 2023-05-02 ENCOUNTER — APPOINTMENT (OUTPATIENT)
Dept: PLASTIC SURGERY | Facility: CLINIC | Age: 53
End: 2023-05-02
Payer: COMMERCIAL

## 2023-05-02 VITALS
OXYGEN SATURATION: 96 % | TEMPERATURE: 98.1 F | WEIGHT: 164 LBS | HEART RATE: 79 BPM | DIASTOLIC BLOOD PRESSURE: 84 MMHG | BODY MASS INDEX: 34.43 KG/M2 | HEIGHT: 58 IN | SYSTOLIC BLOOD PRESSURE: 139 MMHG

## 2023-05-02 DIAGNOSIS — N62 HYPERTROPHY OF BREAST: ICD-10-CM

## 2023-05-02 PROCEDURE — 99203 OFFICE O/P NEW LOW 30 MIN: CPT

## 2023-05-02 NOTE — HISTORY OF PRESENT ILLNESS
[FreeTextEntry1] : Ms. PAYTON QUILES is a 53 year  old female who presents with bilateral macromastia complaining of bilateral shoulder, neck, and back pain for many years.  She states that she has tried an array of supportive bras, including expensive specialty bras, and none of these have helped her.  She has tried Tylenol and ibuprofen which have not provided any relief.  She complains of repetitive bouts of rashes under her breasts and between her breasts which she treats with a variety of topical medications but the rashes keep coming back.  She complains that her large breasts cause her constant daily pain and affect her activities of daily life including making running, exercising, and many other activities very painful.  She wears a H/I bra.  She has no family history of early breast cancer.  She is a non-smoker.  She has no previous history of any breast problems or breast surgeries.  She has no history of traumatic injuries or back surgery that could explain her complaints.  \par \par

## 2023-05-02 NOTE — PHYSICAL EXAM
[NI] : Normal [de-identified] : please see scanned in breast sheet\par Sn-N: L - 42, R - 43\par N-IMF: L - 22, R - 21 1/2\par BW: L/R - 14 1/2\par grade 3 ptosis

## 2023-07-20 ENCOUNTER — APPOINTMENT (OUTPATIENT)
Dept: DERMATOLOGY | Facility: CLINIC | Age: 53
End: 2023-07-20
Payer: COMMERCIAL

## 2023-07-20 PROCEDURE — 99213 OFFICE O/P EST LOW 20 MIN: CPT

## 2023-08-18 NOTE — ED PROVIDER NOTE - NS_EDPROVIDERDISPOUSERTYPE_ED_A_ED
I have personally evaluated and examined the patient. The Attending was available to me as a supervising provider if needed. Detail Level: Zone Add High Risk Medication Management Associated Diagnosis?: No

## 2024-04-17 ENCOUNTER — APPOINTMENT (OUTPATIENT)
Dept: DERMATOLOGY | Facility: CLINIC | Age: 54
End: 2024-04-17
Payer: COMMERCIAL

## 2024-04-17 PROCEDURE — 10060 I&D ABSCESS SIMPLE/SINGLE: CPT

## 2024-04-17 PROCEDURE — 99213 OFFICE O/P EST LOW 20 MIN: CPT | Mod: 25
